# Patient Record
Sex: FEMALE | Race: WHITE | NOT HISPANIC OR LATINO | Employment: UNEMPLOYED | URBAN - METROPOLITAN AREA
[De-identification: names, ages, dates, MRNs, and addresses within clinical notes are randomized per-mention and may not be internally consistent; named-entity substitution may affect disease eponyms.]

---

## 2018-03-08 ENCOUNTER — TRANSCRIBE ORDERS (OUTPATIENT)
Dept: WOMENS IMAGING | Facility: HOSPITAL | Age: 38
End: 2018-03-08

## 2018-03-08 DIAGNOSIS — O28.3 ABNORMAL FETAL ULTRASOUND OF SPINE: Primary | ICD-10-CM

## 2018-03-12 ENCOUNTER — APPOINTMENT (OUTPATIENT)
Dept: WOMENS IMAGING | Facility: HOSPITAL | Age: 38
End: 2018-03-12

## 2018-03-16 ENCOUNTER — OFFICE VISIT (OUTPATIENT)
Dept: OBSTETRICS AND GYNECOLOGY | Facility: HOSPITAL | Age: 38
End: 2018-03-16

## 2018-03-16 ENCOUNTER — HOSPITAL ENCOUNTER (OUTPATIENT)
Dept: WOMENS IMAGING | Facility: HOSPITAL | Age: 38
Discharge: HOME | End: 2018-03-16
Admitting: OBSTETRICS & GYNECOLOGY

## 2018-03-16 VITALS
WEIGHT: 161.6 LBS | HEIGHT: 65 IN | DIASTOLIC BLOOD PRESSURE: 75 MMHG | BODY MASS INDEX: 26.92 KG/M2 | SYSTOLIC BLOOD PRESSURE: 116 MMHG

## 2018-03-16 DIAGNOSIS — Z03.73 FETAL ANOMALY SUSPECTED BUT NOT FOUND: ICD-10-CM

## 2018-03-16 DIAGNOSIS — O28.3 ABNORMAL FETAL ULTRASOUND OF SPINE: ICD-10-CM

## 2018-03-16 DIAGNOSIS — Z34.90 PREGNANCY, UNSPECIFIED GESTATIONAL AGE: Primary | ICD-10-CM

## 2018-03-16 PROCEDURE — 76811 OB US DETAILED SNGL FETUS: CPT | Performed by: OBSTETRICS & GYNECOLOGY

## 2018-03-16 PROCEDURE — 76811 OB US DETAILED SNGL FETUS: CPT

## 2018-03-16 RX ORDER — RANITIDINE 150 MG/1
150 TABLET ORAL 2 TIMES DAILY
COMMUNITY

## 2018-03-16 RX ORDER — HEPARIN SODIUM 5000 [USP'U]/.5ML
5000 INJECTION, SOLUTION INTRAVENOUS; SUBCUTANEOUS 2 TIMES DAILY
COMMUNITY

## 2018-03-16 NOTE — PROGRESS NOTES
Denies vag bleeding, LOF, cramping & contractions. Pt. states had genetic testing (she thinks was [facility]) & was told results were normal.

## 2023-04-24 ENCOUNTER — OFFICE VISIT (OUTPATIENT)
Dept: SLEEP MEDICINE | Facility: HOSPITAL | Age: 43
End: 2023-04-24
Payer: COMMERCIAL

## 2023-04-24 VITALS
HEART RATE: 55 BPM | HEIGHT: 64 IN | DIASTOLIC BLOOD PRESSURE: 70 MMHG | SYSTOLIC BLOOD PRESSURE: 115 MMHG | BODY MASS INDEX: 24.34 KG/M2 | WEIGHT: 142.6 LBS | OXYGEN SATURATION: 96 %

## 2023-04-24 DIAGNOSIS — G47.19 EXCESSIVE DAYTIME SLEEPINESS: Primary | ICD-10-CM

## 2023-04-24 DIAGNOSIS — G47.419 NARCOLEPSY WITHOUT CATAPLEXY: ICD-10-CM

## 2023-04-24 DIAGNOSIS — G47.00 INSOMNIA, UNSPECIFIED TYPE: ICD-10-CM

## 2023-04-24 PROCEDURE — 99204 OFFICE O/P NEW MOD 45 MIN: CPT | Performed by: INTERNAL MEDICINE

## 2023-04-24 RX ORDER — LEVOTHYROXINE SODIUM 0.03 MG/1
25 TABLET ORAL
COMMUNITY

## 2023-04-24 NOTE — PROGRESS NOTES
"  Rachael Booth is a 42 y.o. female.   Chief Complaint   Patient presents with   • Sleeping Problem       HPI     42 y.o. female seen in consultation at the request of Toro Velazco,* for evaluation of the above.     She has a longstanding history of daytime somnolence.  This has dated back to at least her college years.  She remembers classmates telling her that she probably had \"narcolepsy\".    She also has problems with sleep initiation and maintenance.  And will sometimes take her an hour or more to get to sleep.  Sometimes she has problems getting to sleep before the early morning hours.  Even if she is able to get to sleep she will typically awaken once or twice and sometimes have trouble getting back to sleep.    She thinks she averages 6 hours of sleep per night on average.    She will nap during the day when time allows.    She consistently has hypnagogic and hypnopompic hallucinations.  She did experience sleep paralysis when in college but has not had that recently.    She recalls getting weak during episodes of surprise infrequently but otherwise has not had any clear signs of cataplexy.    [place] Scale is: 0/24    The patient's relevant past medical, surgical, family, and social history reviewed and updated in Epic as appropriate.    Current medications are:   Current Outpatient Medications:   •  levothyroxine (SYNTHROID, LEVOTHROID) 25 MCG tablet, Take 1 tablet by mouth Every Morning., Disp: , Rfl: .    Review of Systems    Review of Systems  ROS documented in patient questionnaire ×14 systems.  Reviewed with patient.  Otherwise negative except as noted in HPI.    Physical Exam    Blood pressure 115/70, pulse 55, height 163.8 cm (64.49\"), weight 64.7 kg (142 lb 9.6 oz), SpO2 96 %, unknown if currently breastfeeding. Body mass index is 24.11 kg/m².    Physical Exam  Vitals and nursing note reviewed.   Constitutional:       Appearance: Normal appearance. She is well-developed.   HENT:      Head: " Normocephalic and atraumatic.      Nose: Nose normal.      Mouth/Throat:      Mouth: Mucous membranes are moist.      Pharynx: Oropharynx is clear. No oropharyngeal exudate.      Comments: Class III airway  Eyes:      General: No scleral icterus.     Conjunctiva/sclera: Conjunctivae normal.   Neck:      Thyroid: No thyromegaly.      Trachea: No tracheal deviation.   Cardiovascular:      Rate and Rhythm: Normal rate and regular rhythm.      Heart sounds: No murmur heard.    No friction rub. No gallop.   Pulmonary:      Effort: Pulmonary effort is normal. No respiratory distress.      Breath sounds: No wheezing or rales.   Musculoskeletal:         General: No deformity. Normal range of motion.   Skin:     General: Skin is warm and dry.      Findings: No rash.   Neurological:      Mental Status: She is alert and oriented to person, place, and time.   Psychiatric:         Behavior: Behavior normal.         Thought Content: Thought content normal.         DATA:    Reviewed records from Toro Velazco MD dated 1/18/2023    Reviewed bed partner questionnaire    ASSESSMENT:    Problem List Items Addressed This Visit    None  Visit Diagnoses     Excessive daytime sleepiness    -  Primary    Relevant Orders    Polysomnography 4 or more parameters    Urine Drug Screen - Urine, Clean Catch    Multiple sleep latency test without CPAP    Narcolepsy without cataplexy        Relevant Orders    Polysomnography 4 or more parameters    Urine Drug Screen - Urine, Clean Catch    Multiple sleep latency test without CPAP    Insomnia, unspecified type              42-year-old female who presents with essentially chronic sleepiness and nonspecific disturbed sleep at night.  Her [place] scale is low but she clearly says she does not fall asleep but feels as if she could at all times.  Her  notes very little snoring and no apneas.  She has a normal BMI.  She has no RLS type symptoms.  She does have hypnagogic and hypnopompic  hallucinations and a history of sleep paralysis.  My concern is for narcolepsy without cataplexy and I think she should be tested for this    PLAN:    1. PSG with [facility]  2. I discussed the diagnostic process with her as well as the differential diagnosis  3. She is a good candidate for testing  4. Close sleep center follow-up    I have reviewed the results of my evaluation and impression and discussed my recommendations in detail with the patient.    Signed by  Jason Velazquez MD    April 24, 2023      CC: Toro Velazco MD Cooper, Geoffrey James,*